# Patient Record
Sex: MALE | Race: WHITE | URBAN - METROPOLITAN AREA
[De-identification: names, ages, dates, MRNs, and addresses within clinical notes are randomized per-mention and may not be internally consistent; named-entity substitution may affect disease eponyms.]

---

## 2023-04-14 ENCOUNTER — LAB OUTSIDE AN ENCOUNTER (OUTPATIENT)
Dept: URBAN - METROPOLITAN AREA CLINIC 54 | Facility: CLINIC | Age: 43
End: 2023-04-14

## 2023-04-14 ENCOUNTER — WEB ENCOUNTER (OUTPATIENT)
Dept: URBAN - METROPOLITAN AREA CLINIC 54 | Facility: CLINIC | Age: 43
End: 2023-04-14

## 2023-04-14 ENCOUNTER — OFFICE VISIT (OUTPATIENT)
Dept: URBAN - METROPOLITAN AREA CLINIC 54 | Facility: CLINIC | Age: 43
End: 2023-04-14
Payer: COMMERCIAL

## 2023-04-14 VITALS
HEART RATE: 81 BPM | DIASTOLIC BLOOD PRESSURE: 79 MMHG | HEIGHT: 69 IN | TEMPERATURE: 97.7 F | WEIGHT: 182.8 LBS | SYSTOLIC BLOOD PRESSURE: 113 MMHG | BODY MASS INDEX: 27.08 KG/M2

## 2023-04-14 DIAGNOSIS — K60.2 ANAL FISSURE: ICD-10-CM

## 2023-04-14 DIAGNOSIS — R68.81 EARLY SATIETY: ICD-10-CM

## 2023-04-14 DIAGNOSIS — K61.0 PERIANAL ABSCESS: ICD-10-CM

## 2023-04-14 DIAGNOSIS — R19.7 DIARRHEA, UNSPECIFIED TYPE: ICD-10-CM

## 2023-04-14 DIAGNOSIS — R14.0 ABDOMINAL BLOATING: ICD-10-CM

## 2023-04-14 DIAGNOSIS — R63.0 DECREASED APPETITE: ICD-10-CM

## 2023-04-14 PROBLEM — 64379006: Status: ACTIVE | Noted: 2023-04-14

## 2023-04-14 PROCEDURE — 99214 OFFICE O/P EST MOD 30 MIN: CPT

## 2023-04-14 RX ORDER — OMEPRAZOLE 20 MG/1
1 CAPSULE 30 MINUTES BEFORE MORNING MEAL CAPSULE, DELAYED RELEASE ORAL ONCE A DAY
Qty: 30 | OUTPATIENT
Start: 2023-04-14

## 2023-04-14 RX ORDER — SODIUM, POTASSIUM,MAG SULFATES 17.5-3.13G
354 ML SOLUTION, RECONSTITUTED, ORAL ORAL
Qty: 354 ML | Refills: 0 | OUTPATIENT
Start: 2023-04-14 | End: 2023-04-15

## 2023-04-14 NOTE — HPI-TODAY'S VISIT:
4/14/23: Pt is a healthy 43 yo male who presents complaining of abd bloating, decreased appetite, early satiety, and diarrhea that has worsened over the last several weeks. Diarrhea has been an issue off and on for years. Of notem pt thought he had hemorrhoids 2 weeks ago and went to a hemorrhoids specialist, but reporedly he actually had a perianal abscess. He was referred to [facility] and had a fissurectomy. Overall he is healing well but continues to have 2-3 loose stools a day with upper abd bloating and fullness. No significant pain. Minimal BRBPR since surgery but not prior. No melena or unintentional weight loss.Used to take frequent NSAIDs but stopped early this year. No family hx of CRC, thinks mom may have undiagnosed IBD. No prior EGD or cscope. No recent labs or imaging. Pt is concerned about Crohn's.

## 2023-04-17 LAB
A/G RATIO: 1.5
ABSOLUTE BASOPHILS: 62
ABSOLUTE EOSINOPHILS: 130
ABSOLUTE LYMPHOCYTES: 1829
ABSOLUTE MONOCYTES: 794
ABSOLUTE NEUTROPHILS: 3385
ALBUMIN: 4
ALKALINE PHOSPHATASE: 56
ALT (SGPT): 56
AST (SGOT): 26
BASOPHILS: 1
BILIRUBIN, TOTAL: 0.6
BUN/CREATININE RATIO: (no result)
BUN: 10
C-REACTIVE PROTEIN, QUANT: 19.2
CALCIUM: 9
CARBON DIOXIDE, TOTAL: 26
CHLORIDE: 107
CREATININE: 0.84
EGFR: 113
EOSINOPHILS: 2.1
GLOBULIN, TOTAL: 2.7
GLUCOSE: 86
HEMATOCRIT: 44.7
HEMOGLOBIN: 14.8
IMMUNOGLOBULIN A, QN, SERUM: 298
LYMPHOCYTES: 29.5
MCH: 28.9
MCHC: 33.1
MCV: 87.3
MONOCYTES: 12.8
MPV: 10.9
NEUTROPHILS: 54.6
PLATELET COUNT: 306
POTASSIUM: 4.4
PROTEIN, TOTAL: 6.7
RDW: 12.2
RED BLOOD CELL COUNT: 5.12
SODIUM: 141
T-TRANSGLUTAMINASE (TTG) IGA: <1
T-TRANSGLUTAMINASE (TTG) IGG: <1
WHITE BLOOD CELL COUNT: 6.2

## 2023-04-23 LAB
CALPROTECTIN, FECAL: 131
HELICOBACTER PYLORI AG, EIA, STOOL: (no result)
PANCREATIC ELASTASE, FECAL: >500

## 2023-04-26 ENCOUNTER — TELEPHONE ENCOUNTER (OUTPATIENT)
Dept: URBAN - METROPOLITAN AREA CLINIC 54 | Facility: CLINIC | Age: 43
End: 2023-04-26

## 2023-05-08 ENCOUNTER — WEB ENCOUNTER (OUTPATIENT)
Dept: URBAN - METROPOLITAN AREA CLINIC 54 | Facility: CLINIC | Age: 43
End: 2023-05-08

## 2023-05-08 RX ORDER — OMEPRAZOLE 20 MG/1
1 CAPSULE 30 MINUTES BEFORE MORNING MEAL CAPSULE, DELAYED RELEASE ORAL ONCE A DAY
Qty: 30 | Refills: 1
Start: 2023-04-14

## 2023-05-11 ENCOUNTER — OFFICE VISIT (OUTPATIENT)
Dept: URBAN - METROPOLITAN AREA SURGERY CENTER 14 | Facility: SURGERY CENTER | Age: 43
End: 2023-05-11

## 2023-06-02 ENCOUNTER — OFFICE VISIT (OUTPATIENT)
Dept: URBAN - METROPOLITAN AREA CLINIC 54 | Facility: CLINIC | Age: 43
End: 2023-06-02

## 2023-06-27 ENCOUNTER — OFFICE VISIT (OUTPATIENT)
Dept: URBAN - METROPOLITAN AREA SURGERY CENTER 14 | Facility: SURGERY CENTER | Age: 43
End: 2023-06-27
Payer: COMMERCIAL

## 2023-06-27 DIAGNOSIS — R14.0 ABDOMINAL BLOATING -: ICD-10-CM

## 2023-06-27 DIAGNOSIS — K62.89 OTHER SPECIFIED DISEASES OF ANUS AND RECTUM: ICD-10-CM

## 2023-06-27 DIAGNOSIS — R19.7 DIARRHEA: ICD-10-CM

## 2023-06-27 PROCEDURE — G8907 PT DOC NO EVENTS ON DISCHARG: HCPCS | Performed by: INTERNAL MEDICINE

## 2023-06-27 PROCEDURE — 45378 DIAGNOSTIC COLONOSCOPY: CPT | Performed by: INTERNAL MEDICINE

## 2023-06-27 RX ORDER — OMEPRAZOLE 20 MG/1
1 CAPSULE 30 MINUTES BEFORE MORNING MEAL CAPSULE, DELAYED RELEASE ORAL ONCE A DAY
Qty: 30 | Refills: 1 | Status: ACTIVE | COMMUNITY
Start: 2023-04-14

## 2023-07-31 ENCOUNTER — WEB ENCOUNTER (OUTPATIENT)
Dept: URBAN - METROPOLITAN AREA CLINIC 54 | Facility: CLINIC | Age: 43
End: 2023-07-31

## 2023-07-31 ENCOUNTER — DASHBOARD ENCOUNTERS (OUTPATIENT)
Age: 43
End: 2023-07-31

## 2023-07-31 ENCOUNTER — OFFICE VISIT (OUTPATIENT)
Dept: URBAN - METROPOLITAN AREA CLINIC 54 | Facility: CLINIC | Age: 43
End: 2023-07-31
Payer: COMMERCIAL

## 2023-07-31 VITALS
DIASTOLIC BLOOD PRESSURE: 83 MMHG | SYSTOLIC BLOOD PRESSURE: 123 MMHG | HEIGHT: 69 IN | BODY MASS INDEX: 28.23 KG/M2 | TEMPERATURE: 97.1 F | WEIGHT: 190.6 LBS | HEART RATE: 73 BPM

## 2023-07-31 DIAGNOSIS — K60.2 ANAL FISSURE: ICD-10-CM

## 2023-07-31 DIAGNOSIS — R10.13 DYSPEPSIA: ICD-10-CM

## 2023-07-31 DIAGNOSIS — R19.7 DIARRHEA, UNSPECIFIED TYPE: ICD-10-CM

## 2023-07-31 DIAGNOSIS — K61.0 PERIANAL ABSCESS: ICD-10-CM

## 2023-07-31 PROCEDURE — 99213 OFFICE O/P EST LOW 20 MIN: CPT

## 2023-07-31 RX ORDER — OMEPRAZOLE 20 MG/1
1 CAPSULE 30 MINUTES BEFORE MORNING MEAL CAPSULE, DELAYED RELEASE ORAL ONCE A DAY
Qty: 30 | Refills: 1 | Status: DISCONTINUED | COMMUNITY
Start: 2023-04-14

## 2023-07-31 NOTE — HPI-TODAY'S VISIT:
4/14/23: Pt is a healthy 41 yo male who presents complaining of abd bloating, decreased appetite, early satiety, and diarrhea that has worsened over the last several weeks. Diarrhea has been an issue off and on for years. Of note pt thought he had hemorrhoids 2 weeks ago and went to a hemorrhoids specialist, but reporedly he actually had a perianal abscess. He was referred to [facility] and had a fissurectomy. Overall he is healing well but continues to have 2-3 loose stools a day with upper abd bloating and fullness. No significant pain. Minimal BRBPR since surgery but not prior. No melena or unintentional weight loss.Used to take frequent NSAIDs but stopped early this year. No family hx of CRC, thinks mom may have undiagnosed IBD. No prior EGD or cscope. No recent labs or imaging. Pt is concerned about Crohn's.  7/31/23: Pt RTC for follow up after cscope. Feeling well without any complaints. Dyspeptic symptoms resolved with omeprazole 20mg x 30 days and now he takes prn. No diarrhea, anorectal discomfort or drainage. No evidence of IBD on cscope. - Pinhole ? sinus/fistula opening noted at 5 0' clock, asymptomatic.  Colonoscopy 6/27/23: - The entire examined colon is normal. - The examined portion of the ileum was normal. - Fissurectomy scar at the anus. - Pinhole ? sinus/fistula opening noted at 5 0' clock. - No specimens collected.

## 2023-08-11 ENCOUNTER — ERX REFILL RESPONSE (OUTPATIENT)
Dept: URBAN - METROPOLITAN AREA CLINIC 54 | Facility: CLINIC | Age: 43
End: 2023-08-11

## 2023-08-11 RX ORDER — OMEPRAZOLE 20 MG/1
TAKE 1 CAPSULE BY MOUTH 30 MINUTES BEFORE MORNING MEAL EVERY DAY FOR 30 DAYS CAPSULE, DELAYED RELEASE ORAL
Qty: 30 CAPSULE | Refills: 2 | OUTPATIENT

## 2023-08-11 RX ORDER — OMEPRAZOLE 20 MG/1
TAKE 1 CAPSULE BY MOUTH 30 MINUTES BEFORE MORNING MEAL EVERY DAY FOR 30 DAYS CAPSULE, DELAYED RELEASE ORAL
Qty: 30 CAPSULE | Refills: 1 | OUTPATIENT

## 2023-09-15 ENCOUNTER — WEB ENCOUNTER (OUTPATIENT)
Dept: URBAN - METROPOLITAN AREA CLINIC 54 | Facility: CLINIC | Age: 43
End: 2023-09-15

## 2025-01-21 ENCOUNTER — APPOINTMENT (OUTPATIENT)
Age: 45
Setting detail: DERMATOLOGY
End: 2025-01-21

## 2025-01-21 DIAGNOSIS — Z87.2 PERSONAL HISTORY OF DISEASES OF THE SKIN AND SUBCUTANEOUS TISSUE: ICD-10-CM

## 2025-01-21 DIAGNOSIS — D485 NEOPLASM OF UNCERTAIN BEHAVIOR OF SKIN: ICD-10-CM

## 2025-01-21 DIAGNOSIS — L81.4 OTHER MELANIN HYPERPIGMENTATION: ICD-10-CM

## 2025-01-21 DIAGNOSIS — D22 MELANOCYTIC NEVI: ICD-10-CM

## 2025-01-21 PROBLEM — D48.5 NEOPLASM OF UNCERTAIN BEHAVIOR OF SKIN: Status: ACTIVE | Noted: 2025-01-21

## 2025-01-21 PROBLEM — D22.5 MELANOCYTIC NEVI OF TRUNK: Status: ACTIVE | Noted: 2025-01-21

## 2025-01-21 PROCEDURE — ? BIOPSY BY SHAVE METHOD

## 2025-01-21 PROCEDURE — ? ADDITIONAL NOTES

## 2025-01-21 PROCEDURE — ? COUNSELING

## 2025-01-21 PROCEDURE — 11102 TANGNTL BX SKIN SINGLE LES: CPT

## 2025-01-21 PROCEDURE — 99203 OFFICE O/P NEW LOW 30 MIN: CPT | Mod: 25

## 2025-01-21 ASSESSMENT — LOCATION DETAILED DESCRIPTION DERM
LOCATION DETAILED: LEFT CLAVICULAR NECK
LOCATION DETAILED: RIGHT CLAVICULAR NECK
LOCATION DETAILED: RIGHT MID-UPPER BACK
LOCATION DETAILED: LEFT VENTRAL DISTAL FOREARM
LOCATION DETAILED: RIGHT VENTRAL DISTAL FOREARM
LOCATION DETAILED: LEFT MID-UPPER BACK
LOCATION DETAILED: RIGHT SUPERIOR UPPER BACK

## 2025-01-21 ASSESSMENT — LOCATION SIMPLE DESCRIPTION DERM
LOCATION SIMPLE: LEFT FOREARM
LOCATION SIMPLE: RIGHT UPPER BACK
LOCATION SIMPLE: LEFT UPPER BACK
LOCATION SIMPLE: RIGHT ANTERIOR NECK
LOCATION SIMPLE: LEFT ANTERIOR NECK
LOCATION SIMPLE: RIGHT FOREARM

## 2025-01-21 ASSESSMENT — LOCATION ZONE DERM
LOCATION ZONE: ARM
LOCATION ZONE: TRUNK
LOCATION ZONE: NECK

## 2025-01-21 NOTE — PROCEDURE: ADDITIONAL NOTES
Render Risk Assessment In Note?: no
Additional Notes: - pt reports hx of abnormal mole at outside practice, did not require sx
Detail Level: Simple

## 2025-01-27 ENCOUNTER — TELEPHONE ENCOUNTER (OUTPATIENT)
Dept: URBAN - METROPOLITAN AREA CLINIC 23 | Facility: CLINIC | Age: 45
End: 2025-01-27

## 2025-01-27 ENCOUNTER — OFFICE VISIT (OUTPATIENT)
Dept: URBAN - METROPOLITAN AREA CLINIC 23 | Facility: CLINIC | Age: 45
End: 2025-01-27
Payer: COMMERCIAL

## 2025-01-27 ENCOUNTER — LAB OUTSIDE AN ENCOUNTER (OUTPATIENT)
Dept: URBAN - METROPOLITAN AREA CLINIC 23 | Facility: CLINIC | Age: 45
End: 2025-01-27

## 2025-01-27 VITALS
DIASTOLIC BLOOD PRESSURE: 89 MMHG | HEART RATE: 78 BPM | HEIGHT: 69 IN | WEIGHT: 199.4 LBS | SYSTOLIC BLOOD PRESSURE: 128 MMHG | BODY MASS INDEX: 29.53 KG/M2

## 2025-01-27 DIAGNOSIS — R10.9 LEFT FLANK PAIN: ICD-10-CM

## 2025-01-27 PROCEDURE — 99204 OFFICE O/P NEW MOD 45 MIN: CPT

## 2025-01-27 RX ORDER — OMEPRAZOLE 20 MG/1
TAKE 1 CAPSULE BY MOUTH 30 MINUTES BEFORE MORNING MEAL EVERY DAY FOR 30 DAYS CAPSULE, DELAYED RELEASE ORAL
Qty: 30 CAPSULE | Refills: 1 | Status: ACTIVE | COMMUNITY

## 2025-01-27 NOTE — HPI-TODAY'S VISIT:
44-year-old male here for abdominal pain.  He was last seen in clinic July 2023 by Grisel Dunn PA-C for diarrhea, dyspepsia, perianal abscess and anal fissure that resolved s/p fissurectomy. Colon 06/2023 (Dr. Scherer)- normal colon and TI. Due in 2033.   He states that 2 weeks ago he woke up with left sided flank/back pain.  He went to chiropractor and PCP.  Started on celebrex and muscle relaxers which has somewhat helped Pain occurs around bowel movements and eating.  Pain worse in mornings, somewhat better with BMs. Pain comes and goes.  Denies history of kidney stones 2 BM daily. No straining or bleeding.  No dysuria or hematuria.  He drinks 1-2  beer once a week. No associated nausea or vomitting.  He takes omeprazole 20mg PRN No fever or chills

## 2025-02-11 ENCOUNTER — TELEPHONE ENCOUNTER (OUTPATIENT)
Dept: URBAN - METROPOLITAN AREA CLINIC 23 | Facility: CLINIC | Age: 45
End: 2025-02-11